# Patient Record
Sex: MALE | Race: WHITE | NOT HISPANIC OR LATINO | Employment: OTHER | URBAN - METROPOLITAN AREA
[De-identification: names, ages, dates, MRNs, and addresses within clinical notes are randomized per-mention and may not be internally consistent; named-entity substitution may affect disease eponyms.]

---
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
## 2019-06-23 ENCOUNTER — HOSPITAL ENCOUNTER (EMERGENCY)
Facility: HOSPITAL | Age: 19
Discharge: HOME | End: 2019-06-23
Attending: EMERGENCY MEDICINE
Payer: MEDICAID

## 2019-06-23 VITALS
OXYGEN SATURATION: 98 % | HEART RATE: 88 BPM | HEIGHT: 74 IN | BODY MASS INDEX: 40.43 KG/M2 | TEMPERATURE: 98 F | RESPIRATION RATE: 16 BRPM | DIASTOLIC BLOOD PRESSURE: 83 MMHG | SYSTOLIC BLOOD PRESSURE: 142 MMHG | WEIGHT: 315 LBS

## 2019-06-23 DIAGNOSIS — S62.656A CLOSED NONDISPLACED FRACTURE OF MIDDLE PHALANX OF RIGHT LITTLE FINGER, INITIAL ENCOUNTER: Primary | ICD-10-CM

## 2019-06-23 PROCEDURE — 99283 EMERGENCY DEPT VISIT LOW MDM: CPT

## 2019-06-23 PROCEDURE — 99284 EMERGENCY DEPT VISIT MOD MDM: CPT | Mod: 57,,, | Performed by: EMERGENCY MEDICINE

## 2019-06-23 PROCEDURE — 26720 TREAT FINGER FRACTURE EACH: CPT | Mod: 54,F9,, | Performed by: EMERGENCY MEDICINE

## 2019-06-23 PROCEDURE — 25000003 PHARM REV CODE 250: Performed by: EMERGENCY MEDICINE

## 2019-06-23 PROCEDURE — 26720 PR CLOSE RX PROX/MID FING SHFT FX: ICD-10-PCS | Mod: 54,F9,, | Performed by: EMERGENCY MEDICINE

## 2019-06-23 PROCEDURE — 29130 APPL FINGER SPLINT STATIC: CPT

## 2019-06-23 PROCEDURE — 99284 PR EMERGENCY DEPT VISIT,LEVEL IV: ICD-10-PCS | Mod: 57,,, | Performed by: EMERGENCY MEDICINE

## 2019-06-23 RX ORDER — IBUPROFEN 400 MG/1
800 TABLET ORAL
Status: COMPLETED | OUTPATIENT
Start: 2019-06-23 | End: 2019-06-23

## 2019-06-23 RX ADMIN — IBUPROFEN 800 MG: 400 TABLET, FILM COATED ORAL at 06:06

## 2019-06-23 NOTE — ED PROVIDER NOTES
"Encounter Date: 6/23/2019    SCRIBE #1 NOTE: I, Escobar Walters, am scribing for, and in the presence of,  Gary Rodriguez MD. I have scribed the following portions of the note - Other sections scribed: HPI, ROS, PE.       History     Chief Complaint   Patient presents with    Hand Pain     Mr. Monteiro is a 18 year old male with a medical history of anxiety, depression, and PTSD who reports to the ED with complaints of a finger injury. The patient states that he and his girlfriend were at [employer] where they were playing basketball while jumping on the trampoline when he jammed his left small finger. He states that it bent really far back, and then "popped back forward into place." His finger is in pain, and he is having trouble closing his finger all the way. There is no deformity. Taken nothing for pain. Moving finger worsens pain. Denies wound, numbness, tingling, weakness, other injury.    The history is provided by the patient.     Review of patient's allergies indicates:  No Known Allergies  Past Medical History:   Diagnosis Date    Anxiety     Depression     Hx of psychiatric care     Psychiatric problem     PTSD (post-traumatic stress disorder)     Therapy      No past surgical history on file.  No family history on file.  Social History     Tobacco Use    Smoking status: Current Every Day Smoker     Types: Vaping with nicotine    Smokeless tobacco: Never Used   Substance Use Topics    Alcohol use: No    Drug use: No     Review of Systems   Constitutional: Negative for chills and fever.   HENT: Negative for rhinorrhea and sore throat.    Respiratory: Negative for cough and shortness of breath.    Cardiovascular: Negative for chest pain and leg swelling.   Gastrointestinal: Negative for abdominal pain, diarrhea and nausea.   Genitourinary: Negative for dysuria and hematuria.   Musculoskeletal: Positive for arthralgias. Negative for back pain.   Skin: Negative for pallor and " rash.   Neurological: Negative for syncope and weakness.   Hematological: Negative for adenopathy. Does not bruise/bleed easily.       Physical Exam     Initial Vitals [06/23/19 1709]   BP Pulse Resp Temp SpO2   (!) 149/85 104 16 98.6 °F (37 °C) 98 %      MAP       --         Physical Exam    Nursing note and vitals reviewed.  Constitutional: He appears well-developed and well-nourished. He is not diaphoretic. No distress.   HENT:   Head: Normocephalic and atraumatic.   Right Ear: External ear normal.   Left Ear: External ear normal.   Neck: Neck supple.   Cardiovascular: Normal rate, regular rhythm, normal heart sounds and intact distal pulses.   Pulmonary/Chest: Breath sounds normal. No respiratory distress. He has no wheezes. He has no rhonchi. He has no rales.   Abdominal: Soft. He exhibits no distension. There is no tenderness.   Musculoskeletal:   L small finger: Mildly decreased motion of the PIP and DIP due to pain; No obvious deformity; Tenderness to the PIP.   Neurological: He is alert and oriented to person, place, and time. GCS score is 15. GCS eye subscore is 4. GCS verbal subscore is 5. GCS motor subscore is 6.   Subjective decreased sensation to touch to all of the left small finger.   Skin: Skin is warm. Capillary refill takes less than 2 seconds. No rash noted.   Psychiatric: He has a normal mood and affect.         ED Course   Procedures  Labs Reviewed - No data to display       Imaging Results          X-Ray Hand 3 view Left (Final result)  Result time 06/23/19 18:44:42    Final result by Oskar Bower MD (06/23/19 18:44:42)                 Impression:      Tiny volar avulsion fracture fragment of the 5th PIP.      Electronically signed by: Oskar Bower  Date:    06/23/2019  Time:    18:44             Narrative:    EXAMINATION:  XR HAND COMPLETE 3 VIEW LEFT    CLINICAL HISTORY:  left small finger DIP pain;.    TECHNIQUE:  PA, lateral, and oblique views of the left hand were  performed.    COMPARISON:  None    FINDINGS:  Frontal, oblique and lateral views presented.  Frontal view appears to have significant dorsiflexion at the carpus accounting for the overlapped appearance of the radius and ulna with the proximal carpal row.    There is a tiny cortical fragment seen at the radial volar aspect of the 5th PIP joint.  This could be a small avulsion fracture perhaps related to the volar plate and a hyperextension injury.  There is mild soft tissue swelling of the 5th finger.  Otherwise, mineralization and joint space and alignment are normal..                                 Medical Decision Making:   History:   Old Medical Records: I decided to obtain old medical records.  Independently Interpreted Test(s):   I have ordered and independently interpreted X-rays - see summary below.       <> Summary of X-Ray Reading(s): XR hand w/ small avulsion fx of middle phalanx at PIP of small finger on my read.  Clinical Tests:   Radiological Study: Ordered and Reviewed  ED Management:  Vitals normal. Afebrile. Here w/ finger injury. Motrin given for pain. XR obtained. Active and passive ROM normal; strength normal and limited only by pain.  XR w/ small avulsion fx.  Placed in finger splint. Provided phone number for [facility] to establish follow up and stressed importance of f/u.  Stable for discharge at this time. Return precautions discussed.             Scribe Attestation:   Scribe #1: I performed the above scribed service and the documentation accurately describes the services I performed. I attest to the accuracy of the note.               Clinical Impression:       ICD-10-CM ICD-9-CM   1. Closed nondisplaced fracture of middle phalanx of right little finger, initial encounter S62.656A 816.01         Disposition:   Disposition: Discharged  Condition: Stable                        Gary Rodriguez MD  06/24/19 [id number]

## 2019-06-23 NOTE — ED TRIAGE NOTES
"Pt. Presents to ED today with c/o left pinky pain after a "basketball hit it and it bent backwards". +swelling noted, decreased movement, pain increased with movement. +PMS to left upper extremity. No other complaints. States pain 7/10 at this time.   "

## 2019-06-24 NOTE — DISCHARGE INSTRUCTIONS
If you would like to follow up with the [facility] for further care of your fracture, please call the [facility] Scheduling Department at [phone] during business hours.  Please let the  know you need a fracture follow-up appointment with Orthopedics, and you will be scheduled in the Orthopedic Clinic.  Please bring your original Emergency Department discharge papers with  you to the clinic appointment.

## 2019-06-24 NOTE — ED NOTES
"Static splint applied to left pinky finger. Pt states, "it feels perfect. Not too loose and not too tight. I can't bend it and that's what you said we wanted." pt educated on purpose and goal of splint. Verbalized understanding and repeated back. No acute distress noted. Stable condition. Family member at bedside.   "

## 2020-03-11 PROBLEM — R45.851 SUICIDAL IDEATIONS: Status: ACTIVE | Noted: 2020-03-11

## 2020-03-11 PROBLEM — R03.0 ELEVATED BP WITHOUT DIAGNOSIS OF HYPERTENSION: Status: ACTIVE | Noted: 2020-03-11

## 2020-03-11 PROBLEM — L02.211 ABSCESS OF SKIN OF ABDOMEN: Status: ACTIVE | Noted: 2020-03-11

## 2020-03-11 PROBLEM — G93.0 CYST OF BRAIN: Status: ACTIVE | Noted: 2020-03-11

## 2020-03-11 PROBLEM — E66.9 OBESE: Status: ACTIVE | Noted: 2020-03-11

## 2020-06-25 PROBLEM — I10 ESSENTIAL HYPERTENSION: Status: ACTIVE | Noted: 2020-06-25

## 2020-06-25 PROBLEM — M19.90 ARTHRITIS: Status: ACTIVE | Noted: 2020-06-25

## 2020-06-25 PROBLEM — S41.112A ARM LACERATION WITH COMPLICATION, LEFT, INITIAL ENCOUNTER: Status: ACTIVE | Noted: 2020-06-25

## 2020-07-03 PROBLEM — R45.851 SUICIDAL IDEATIONS: Status: RESOLVED | Noted: 2020-03-11 | Resolved: 2020-07-03

## 2020-10-21 NOTE — ED NOTES
Pt. Removed his ankle restraints. Sitter at the bedside. Pt. Is cooperative and calm. Charge nurse made aware. Pt. Instructed that if he remains calm the ankle restraints can remain off.   yes

## 2021-06-08 ENCOUNTER — OFFICE VISIT (OUTPATIENT)
Dept: SLEEP MEDICINE | Facility: CLINIC | Age: 21
End: 2021-06-08
Payer: MEDICAID

## 2021-06-08 DIAGNOSIS — F33.2 SEVERE EPISODE OF RECURRENT MAJOR DEPRESSIVE DISORDER, WITHOUT PSYCHOTIC FEATURES: ICD-10-CM

## 2021-06-08 DIAGNOSIS — G93.0 CYST OF BRAIN: ICD-10-CM

## 2021-06-08 DIAGNOSIS — I10 ESSENTIAL HYPERTENSION: ICD-10-CM

## 2021-06-08 DIAGNOSIS — F60.3 BORDERLINE PERSONALITY DISORDER: ICD-10-CM

## 2021-06-08 DIAGNOSIS — R06.83 SNORING: ICD-10-CM

## 2021-06-08 DIAGNOSIS — F43.10 PTSD (POST-TRAUMATIC STRESS DISORDER): Primary | ICD-10-CM

## 2021-06-08 PROCEDURE — 99203 OFFICE O/P NEW LOW 30 MIN: CPT | Mod: 95,,, | Performed by: INTERNAL MEDICINE

## 2021-06-08 PROCEDURE — 99203 PR OFFICE/OUTPT VISIT, NEW, LEVL III, 30-44 MIN: ICD-10-PCS | Mod: 95,,, | Performed by: INTERNAL MEDICINE

## 2021-06-08 RX ORDER — LISINOPRIL 10 MG/1
20 TABLET ORAL DAILY
COMMUNITY
Start: 2021-05-29

## 2021-06-08 RX ORDER — LURASIDONE HYDROCHLORIDE 20 MG/1
40 TABLET, FILM COATED ORAL NIGHTLY
COMMUNITY
Start: 2021-04-16

## 2021-06-08 RX ORDER — HYDROCHLOROTHIAZIDE 12.5 MG/1
12.5 TABLET ORAL DAILY
COMMUNITY
Start: 2021-06-02

## 2021-06-11 ENCOUNTER — TELEPHONE (OUTPATIENT)
Dept: SLEEP MEDICINE | Facility: OTHER | Age: 21
End: 2021-06-11

## 2021-07-08 ENCOUNTER — TELEPHONE (OUTPATIENT)
Dept: SLEEP MEDICINE | Facility: OTHER | Age: 21
End: 2021-07-08

## 2021-07-12 ENCOUNTER — TELEPHONE (OUTPATIENT)
Dept: SLEEP MEDICINE | Facility: OTHER | Age: 21
End: 2021-07-12

## 2021-07-13 ENCOUNTER — TELEPHONE (OUTPATIENT)
Dept: SLEEP MEDICINE | Facility: OTHER | Age: 21
End: 2021-07-13

## 2021-07-30 ENCOUNTER — TELEPHONE (OUTPATIENT)
Dept: SLEEP MEDICINE | Facility: OTHER | Age: 21
End: 2021-07-30

## 2021-08-06 ENCOUNTER — HOSPITAL ENCOUNTER (OUTPATIENT)
Dept: SLEEP MEDICINE | Facility: OTHER | Age: 21
Discharge: HOME | End: 2021-08-06
Attending: INTERNAL MEDICINE
Payer: MEDICAID

## 2021-08-06 DIAGNOSIS — G47.33 OSA (OBSTRUCTIVE SLEEP APNEA): Primary | ICD-10-CM

## 2021-08-06 PROCEDURE — 95800 SLP STDY UNATTENDED: CPT

## 2021-08-06 PROCEDURE — 95800 SLP STDY UNATTENDED: CPT | Mod: 26,52,, | Performed by: INTERNAL MEDICINE

## 2021-08-06 PROCEDURE — 95800 PR SLEEP STUDY, UNATTENDED, RECORD HEART RATE/O2 SAT/RESP ANAL/SLEEP TIME: ICD-10-PCS | Mod: 26,52,, | Performed by: INTERNAL MEDICINE

## 2021-08-15 ENCOUNTER — PATIENT MESSAGE (OUTPATIENT)
Dept: SLEEP MEDICINE | Facility: CLINIC | Age: 21
End: 2021-08-15

## 2021-08-15 DIAGNOSIS — G47.33 OSA (OBSTRUCTIVE SLEEP APNEA): Primary | ICD-10-CM

## 2021-08-15 DIAGNOSIS — I10 ESSENTIAL HYPERTENSION: ICD-10-CM
